# Patient Record
Sex: FEMALE | Race: WHITE | URBAN - METROPOLITAN AREA
[De-identification: names, ages, dates, MRNs, and addresses within clinical notes are randomized per-mention and may not be internally consistent; named-entity substitution may affect disease eponyms.]

---

## 2019-12-10 ENCOUNTER — APPOINTMENT (RX ONLY)
Dept: URBAN - METROPOLITAN AREA CLINIC 281 | Facility: CLINIC | Age: 79
Setting detail: DERMATOLOGY
End: 2019-12-10

## 2019-12-10 DIAGNOSIS — L81.4 OTHER MELANIN HYPERPIGMENTATION: ICD-10-CM

## 2019-12-10 DIAGNOSIS — L56.5 DISSEMINATED SUPERFICIAL ACTINIC POROKERATOSIS (DSAP): ICD-10-CM

## 2019-12-10 DIAGNOSIS — L82.0 INFLAMED SEBORRHEIC KERATOSIS: ICD-10-CM

## 2019-12-10 DIAGNOSIS — L82.1 OTHER SEBORRHEIC KERATOSIS: ICD-10-CM

## 2019-12-10 DIAGNOSIS — L85.3 XEROSIS CUTIS: ICD-10-CM

## 2019-12-10 DIAGNOSIS — D22 MELANOCYTIC NEVI: ICD-10-CM

## 2019-12-10 PROBLEM — D22.5 MELANOCYTIC NEVI OF TRUNK: Status: ACTIVE | Noted: 2019-12-10

## 2019-12-10 PROCEDURE — 17110 DESTRUCTION B9 LES UP TO 14: CPT

## 2019-12-10 PROCEDURE — ? COUNSELING

## 2019-12-10 PROCEDURE — ? LIQUID NITROGEN

## 2019-12-10 PROCEDURE — 99213 OFFICE O/P EST LOW 20 MIN: CPT | Mod: 25

## 2019-12-10 ASSESSMENT — LOCATION ZONE DERM
LOCATION ZONE: TRUNK
LOCATION ZONE: FEET
LOCATION ZONE: FINGER
LOCATION ZONE: NECK

## 2019-12-10 ASSESSMENT — LOCATION DETAILED DESCRIPTION DERM
LOCATION DETAILED: LEFT MID-UPPER BACK
LOCATION DETAILED: INFERIOR THORACIC SPINE
LOCATION DETAILED: LEFT SUPERIOR MEDIAL UPPER BACK
LOCATION DETAILED: RIGHT MEDIAL PLANTAR MIDFOOT
LOCATION DETAILED: RIGHT RIB CAGE
LOCATION DETAILED: LEFT CLAVICULAR NECK
LOCATION DETAILED: RIGHT PROXIMAL PALMAR SMALL FINGER

## 2019-12-10 ASSESSMENT — LOCATION SIMPLE DESCRIPTION DERM
LOCATION SIMPLE: LEFT UPPER BACK
LOCATION SIMPLE: RIGHT SMALL FINGER
LOCATION SIMPLE: RIGHT PLANTAR SURFACE
LOCATION SIMPLE: UPPER BACK
LOCATION SIMPLE: LEFT ANTERIOR NECK
LOCATION SIMPLE: ABDOMEN

## 2019-12-10 NOTE — PROCEDURE: LIQUID NITROGEN
Include Z78.9 (Other Specified Conditions Influencing Health Status) As An Associated Diagnosis?: No
Detail Level: Detailed
Post-Care Instructions: I reviewed with the patient in detail post-care instructions. Patient is to avoid picking at any of the treated lesions. Pt may apply Vaseline to crusted or scabbing areas.
Medical Necessity Clause: This procedure was medically necessary because the lesions that were treated were:
Consent: Verbal consent was obtained including but not limited to risks of crusting, scabbing, blistering, scarring, darker or lighter pigmentary change, recurrence, incomplete removal and infection.
Number Of Freeze-Thaw Cycles: 2 freeze-thaw cycles
Medical Necessity Information: It is in your best interest to select a reason for this procedure from the list below. All of these items fulfill various CMS LCD requirements except the new and changing color options.
Render Post-Care Instructions In Note?: yes

## 2022-01-21 ENCOUNTER — APPOINTMENT (RX ONLY)
Dept: URBAN - METROPOLITAN AREA CLINIC 281 | Facility: CLINIC | Age: 82
Setting detail: DERMATOLOGY
End: 2022-01-21

## 2022-01-21 DIAGNOSIS — L85.3 XEROSIS CUTIS: ICD-10-CM

## 2022-01-21 DIAGNOSIS — L57.0 ACTINIC KERATOSIS: ICD-10-CM

## 2022-01-21 DIAGNOSIS — D22 MELANOCYTIC NEVI: ICD-10-CM

## 2022-01-21 DIAGNOSIS — D18.0 HEMANGIOMA: ICD-10-CM

## 2022-01-21 DIAGNOSIS — L81.4 OTHER MELANIN HYPERPIGMENTATION: ICD-10-CM

## 2022-01-21 DIAGNOSIS — L82.1 OTHER SEBORRHEIC KERATOSIS: ICD-10-CM

## 2022-01-21 DIAGNOSIS — L56.5 DISSEMINATED SUPERFICIAL ACTINIC POROKERATOSIS (DSAP): ICD-10-CM

## 2022-01-21 DIAGNOSIS — L73.8 OTHER SPECIFIED FOLLICULAR DISORDERS: ICD-10-CM

## 2022-01-21 PROBLEM — D18.01 HEMANGIOMA OF SKIN AND SUBCUTANEOUS TISSUE: Status: ACTIVE | Noted: 2022-01-21

## 2022-01-21 PROBLEM — D22.5 MELANOCYTIC NEVI OF TRUNK: Status: ACTIVE | Noted: 2022-01-21

## 2022-01-21 PROCEDURE — ? LIQUID NITROGEN

## 2022-01-21 PROCEDURE — ? COUNSELING

## 2022-01-21 PROCEDURE — ? FULL BODY SKIN EXAM

## 2022-01-21 PROCEDURE — 17000 DESTRUCT PREMALG LESION: CPT

## 2022-01-21 PROCEDURE — ? TREATMENT REGIMEN

## 2022-01-21 PROCEDURE — 17003 DESTRUCT PREMALG LES 2-14: CPT

## 2022-01-21 PROCEDURE — 99213 OFFICE O/P EST LOW 20 MIN: CPT | Mod: 25

## 2022-01-21 ASSESSMENT — LOCATION SIMPLE DESCRIPTION DERM
LOCATION SIMPLE: LEFT UPPER BACK
LOCATION SIMPLE: RIGHT FOREHEAD
LOCATION SIMPLE: POSTERIOR NECK
LOCATION SIMPLE: UPPER BACK
LOCATION SIMPLE: RIGHT SMALL FINGER
LOCATION SIMPLE: RIGHT EYEBROW
LOCATION SIMPLE: RIGHT PLANTAR SURFACE
LOCATION SIMPLE: LEFT FOREHEAD
LOCATION SIMPLE: LEFT ANTERIOR NECK
LOCATION SIMPLE: LEFT UPPER LIP
LOCATION SIMPLE: RIGHT LIP

## 2022-01-21 ASSESSMENT — LOCATION ZONE DERM
LOCATION ZONE: FEET
LOCATION ZONE: LIP
LOCATION ZONE: TRUNK
LOCATION ZONE: FINGER
LOCATION ZONE: NECK
LOCATION ZONE: FACE

## 2022-01-21 ASSESSMENT — LOCATION DETAILED DESCRIPTION DERM
LOCATION DETAILED: RIGHT SUPERIOR FOREHEAD
LOCATION DETAILED: RIGHT CENTRAL EYEBROW
LOCATION DETAILED: LEFT SUPERIOR MEDIAL UPPER BACK
LOCATION DETAILED: RIGHT PROXIMAL PALMAR SMALL FINGER
LOCATION DETAILED: LEFT CLAVICULAR NECK
LOCATION DETAILED: LEFT SUPERIOR VERMILION BORDER
LOCATION DETAILED: LEFT MEDIAL TRAPEZIAL NECK
LOCATION DETAILED: RIGHT SUPERIOR VERMILION LIP
LOCATION DETAILED: RIGHT MEDIAL PLANTAR MIDFOOT
LOCATION DETAILED: LEFT FOREHEAD
LOCATION DETAILED: INFERIOR THORACIC SPINE

## 2022-01-21 NOTE — PROCEDURE: LIQUID NITROGEN
Duration Of Freeze Thaw-Cycle (Seconds): 0
Show Aperture Variable?: Yes
Render Post-Care Instructions In Note?: no
Consent: The patient's consent was obtained including but not limited to risks of crusting, scabbing, blistering, scarring, darker or lighter pigmentary change, recurrence, incomplete removal and infection.
Post-Care Instructions: I reviewed with the patient in detail post-care instructions. Patient is to wear sunprotection, and avoid picking at any of the treated lesions. Pt may apply Vaseline to crusted or scabbing areas.
Number Of Freeze-Thaw Cycles: 2 freeze-thaw cycles
Detail Level: Detailed

## 2022-03-21 ENCOUNTER — APPOINTMENT (RX ONLY)
Dept: URBAN - METROPOLITAN AREA CLINIC 281 | Facility: CLINIC | Age: 82
Setting detail: DERMATOLOGY
End: 2022-03-21

## 2022-03-21 DIAGNOSIS — L30.8 OTHER SPECIFIED DERMATITIS: ICD-10-CM | Status: WORSENING

## 2022-03-21 DIAGNOSIS — L57.0 ACTINIC KERATOSIS: ICD-10-CM

## 2022-03-21 DIAGNOSIS — L56.5 DISSEMINATED SUPERFICIAL ACTINIC POROKERATOSIS (DSAP): ICD-10-CM

## 2022-03-21 PROBLEM — L30.9 DERMATITIS, UNSPECIFIED: Status: ACTIVE | Noted: 2022-03-21

## 2022-03-21 PROCEDURE — ? COUNSELING

## 2022-03-21 PROCEDURE — ? PRESCRIPTION

## 2022-03-21 PROCEDURE — ? PRESCRIPTION MEDICATION MANAGEMENT

## 2022-03-21 PROCEDURE — 99214 OFFICE O/P EST MOD 30 MIN: CPT

## 2022-03-21 RX ORDER — BETAMETHASONE DIPROPIONATE 0.5 MG/G
CREAM TOPICAL
Qty: 15 | Refills: 1 | Status: ERX | COMMUNITY
Start: 2022-03-21

## 2022-03-21 RX ADMIN — BETAMETHASONE DIPROPIONATE: 0.5 CREAM TOPICAL at 00:00

## 2022-03-21 ASSESSMENT — LOCATION SIMPLE DESCRIPTION DERM
LOCATION SIMPLE: RIGHT ANKLE
LOCATION SIMPLE: RIGHT RING FINGER
LOCATION SIMPLE: LEFT LIP
LOCATION SIMPLE: RIGHT MIDDLE FINGER

## 2022-03-21 ASSESSMENT — LOCATION DETAILED DESCRIPTION DERM
LOCATION DETAILED: RIGHT PROXIMAL DORSAL MIDDLE FINGER
LOCATION DETAILED: RIGHT RING FINGER DISTAL INTERPHALANGEAL JOINT
LOCATION DETAILED: RIGHT ANTERIOR MEDIAL MALLEOLUS
LOCATION DETAILED: LEFT UPPER CUTANEOUS LIP

## 2022-03-21 ASSESSMENT — LOCATION ZONE DERM
LOCATION ZONE: LEG
LOCATION ZONE: LIP
LOCATION ZONE: FINGER

## 2022-03-21 NOTE — PROCEDURE: PRESCRIPTION MEDICATION MANAGEMENT
Render In Strict Bullet Format?: No
Initiate Treatment: Betamethasone dipropionate 0.05 % topical cream
Detail Level: Zone

## 2023-01-31 ENCOUNTER — APPOINTMENT (RX ONLY)
Dept: URBAN - METROPOLITAN AREA CLINIC 281 | Facility: CLINIC | Age: 83
Setting detail: DERMATOLOGY
End: 2023-01-31

## 2023-01-31 DIAGNOSIS — L82.1 OTHER SEBORRHEIC KERATOSIS: ICD-10-CM

## 2023-01-31 DIAGNOSIS — D18.0 HEMANGIOMA: ICD-10-CM

## 2023-01-31 DIAGNOSIS — L56.5 DISSEMINATED SUPERFICIAL ACTINIC POROKERATOSIS (DSAP): ICD-10-CM

## 2023-01-31 DIAGNOSIS — L81.4 OTHER MELANIN HYPERPIGMENTATION: ICD-10-CM

## 2023-01-31 DIAGNOSIS — L71.8 OTHER ROSACEA: ICD-10-CM

## 2023-01-31 DIAGNOSIS — L57.0 ACTINIC KERATOSIS: ICD-10-CM

## 2023-01-31 DIAGNOSIS — D22 MELANOCYTIC NEVI: ICD-10-CM

## 2023-01-31 PROBLEM — D22.5 MELANOCYTIC NEVI OF TRUNK: Status: ACTIVE | Noted: 2023-01-31

## 2023-01-31 PROBLEM — D18.01 HEMANGIOMA OF SKIN AND SUBCUTANEOUS TISSUE: Status: ACTIVE | Noted: 2023-01-31

## 2023-01-31 PROCEDURE — 17000 DESTRUCT PREMALG LESION: CPT

## 2023-01-31 PROCEDURE — ? SUNSCREEN RECOMMENDATIONS

## 2023-01-31 PROCEDURE — 17003 DESTRUCT PREMALG LES 2-14: CPT

## 2023-01-31 PROCEDURE — 99213 OFFICE O/P EST LOW 20 MIN: CPT | Mod: 25

## 2023-01-31 PROCEDURE — ? COUNSELING

## 2023-01-31 PROCEDURE — ? LIQUID NITROGEN

## 2023-01-31 PROCEDURE — ? FULL BODY SKIN EXAM

## 2023-01-31 ASSESSMENT — LOCATION SIMPLE DESCRIPTION DERM
LOCATION SIMPLE: RIGHT ANKLE
LOCATION SIMPLE: RIGHT UPPER BACK
LOCATION SIMPLE: LEFT CHEEK
LOCATION SIMPLE: GLABELLA
LOCATION SIMPLE: LEFT LIP
LOCATION SIMPLE: POSTERIOR SCALP
LOCATION SIMPLE: LEFT ANKLE

## 2023-01-31 ASSESSMENT — LOCATION ZONE DERM
LOCATION ZONE: LEG
LOCATION ZONE: TRUNK
LOCATION ZONE: SCALP
LOCATION ZONE: LIP
LOCATION ZONE: FACE

## 2023-01-31 ASSESSMENT — LOCATION DETAILED DESCRIPTION DERM
LOCATION DETAILED: LEFT ANKLE
LOCATION DETAILED: LEFT MEDIAL MALAR CHEEK
LOCATION DETAILED: MID-OCCIPITAL SCALP
LOCATION DETAILED: RIGHT MEDIAL UPPER BACK
LOCATION DETAILED: GLABELLA
LOCATION DETAILED: LEFT UPPER CUTANEOUS LIP
LOCATION DETAILED: RIGHT ANKLE
LOCATION DETAILED: RIGHT SUPERIOR MEDIAL UPPER BACK

## 2023-01-31 NOTE — PROCEDURE: LIQUID NITROGEN
Render Post-Care Instructions In Note?: no
Detail Level: Detailed
Show Applicator Variable?: Yes
Duration Of Freeze Thaw-Cycle (Seconds): 0
Post-Care Instructions: I reviewed with the patient in detail post-care instructions. Patient is to wear sunprotection, and avoid picking at any of the treated lesions. Pt may apply Vaseline to crusted or scabbing areas.
Number Of Freeze-Thaw Cycles: 2 freeze-thaw cycles
Consent: The patient's consent was obtained including but not limited to risks of crusting, scabbing, blistering, scarring, darker or lighter pigmentary change, recurrence, incomplete removal and infection.

## 2023-01-31 NOTE — PROCEDURE: MIPS QUALITY
Quality 226: Preventive Care And Screening: Tobacco Use: Screening And Cessation Intervention: Patient screened for tobacco use and is an ex/non-smoker
Detail Level: Detailed
Quality 130: Documentation Of Current Medications In The Medical Record: Current Medications Documented
Quality 47: Advance Care Plan: Advance Care Planning discussed and documented; advance care plan or surrogate decision maker documented in the medical record.
Quality 110: Preventive Care And Screening: Influenza Immunization: Influenza Immunization Administered during Influenza season
Quality 431: Preventive Care And Screening: Unhealthy Alcohol Use - Screening: Patient not identified as an unhealthy alcohol user when screened for unhealthy alcohol use using a systematic screening method

## 2024-08-12 ENCOUNTER — APPOINTMENT (RX ONLY)
Dept: URBAN - METROPOLITAN AREA CLINIC 281 | Facility: CLINIC | Age: 84
Setting detail: DERMATOLOGY
End: 2024-08-12

## 2024-08-12 DIAGNOSIS — L56.5 DISSEMINATED SUPERFICIAL ACTINIC POROKERATOSIS (DSAP): ICD-10-CM

## 2024-08-12 DIAGNOSIS — L30.4 ERYTHEMA INTERTRIGO: ICD-10-CM

## 2024-08-12 DIAGNOSIS — L81.4 OTHER MELANIN HYPERPIGMENTATION: ICD-10-CM

## 2024-08-12 DIAGNOSIS — D18.0 HEMANGIOMA: ICD-10-CM

## 2024-08-12 DIAGNOSIS — L82.1 OTHER SEBORRHEIC KERATOSIS: ICD-10-CM

## 2024-08-12 DIAGNOSIS — D22 MELANOCYTIC NEVI: ICD-10-CM

## 2024-08-12 DIAGNOSIS — L57.0 ACTINIC KERATOSIS: ICD-10-CM

## 2024-08-12 PROBLEM — D18.01 HEMANGIOMA OF SKIN AND SUBCUTANEOUS TISSUE: Status: ACTIVE | Noted: 2024-08-12

## 2024-08-12 PROBLEM — D22.5 MELANOCYTIC NEVI OF TRUNK: Status: ACTIVE | Noted: 2024-08-12

## 2024-08-12 PROCEDURE — 99213 OFFICE O/P EST LOW 20 MIN: CPT | Mod: 25

## 2024-08-12 PROCEDURE — ? LIQUID NITROGEN

## 2024-08-12 PROCEDURE — ? FULL BODY SKIN EXAM

## 2024-08-12 PROCEDURE — ? SUNSCREEN RECOMMENDATIONS

## 2024-08-12 PROCEDURE — ? PRESCRIPTION MEDICATION MANAGEMENT

## 2024-08-12 PROCEDURE — 17000 DESTRUCT PREMALG LESION: CPT

## 2024-08-12 PROCEDURE — ? COUNSELING

## 2024-08-12 ASSESSMENT — LOCATION DETAILED DESCRIPTION DERM
LOCATION DETAILED: LEFT ANKLE
LOCATION DETAILED: SUPERIOR THORACIC SPINE
LOCATION DETAILED: LEFT ELBOW
LOCATION DETAILED: SUPERIOR LUMBAR SPINE
LOCATION DETAILED: RIGHT SUPERIOR MEDIAL UPPER BACK
LOCATION DETAILED: RIGHT MEDIAL UPPER BACK
LOCATION DETAILED: RIGHT ANKLE

## 2024-08-12 ASSESSMENT — LOCATION SIMPLE DESCRIPTION DERM
LOCATION SIMPLE: LOWER BACK
LOCATION SIMPLE: LEFT ELBOW
LOCATION SIMPLE: LEFT ANKLE
LOCATION SIMPLE: RIGHT UPPER BACK
LOCATION SIMPLE: RIGHT ANKLE
LOCATION SIMPLE: UPPER BACK

## 2024-08-12 ASSESSMENT — LOCATION ZONE DERM
LOCATION ZONE: LEG
LOCATION ZONE: ARM
LOCATION ZONE: TRUNK

## 2024-08-12 ASSESSMENT — BSA RASH: BSA RASH: 1

## 2024-08-12 ASSESSMENT — SEVERITY ASSESSMENT: SEVERITY: ALMOST CLEAR

## 2024-08-12 ASSESSMENT — PAIN INTENSITY VAS: HOW INTENSE IS YOUR PAIN 0 BEING NO PAIN, 10 BEING THE MOST SEVERE PAIN POSSIBLE?: NO PAIN

## 2024-08-12 NOTE — PROCEDURE: PRESCRIPTION MEDICATION MANAGEMENT
Detail Level: Zone
Initiate Treatment: Restart : ketoconazole 2 % topical cream Bid; Sig: Apply to affected area BID PRN until clear ( Rx prescribed by PCP).
Render In Strict Bullet Format?: No
Statement Selected

## 2024-08-12 NOTE — PROCEDURE: LIQUID NITROGEN
Show Applicator Variable?: Yes
Render Post-Care Instructions In Note?: no
Detail Level: Detailed
Duration Of Freeze Thaw-Cycle (Seconds): 0
Post-Care Instructions: I reviewed with the patient in detail post-care instructions. Patient is to wear sunprotection, and avoid picking at any of the treated lesions. Pt may apply Vaseline to crusted or scabbing areas.
Consent: The patient's consent was obtained including but not limited to risks of crusting, scabbing, blistering, scarring, darker or lighter pigmentary change, recurrence, incomplete removal and infection.
Number Of Freeze-Thaw Cycles: 3 freeze-thaw cycles

## 2025-03-20 ENCOUNTER — APPOINTMENT (OUTPATIENT)
Dept: URBAN - METROPOLITAN AREA CLINIC 281 | Facility: CLINIC | Age: 85
Setting detail: DERMATOLOGY
End: 2025-03-20

## 2025-03-20 DIAGNOSIS — L91.8 OTHER HYPERTROPHIC DISORDERS OF THE SKIN: ICD-10-CM

## 2025-03-20 DIAGNOSIS — L56.5 DISSEMINATED SUPERFICIAL ACTINIC POROKERATOSIS (DSAP): ICD-10-CM

## 2025-03-20 DIAGNOSIS — L72.0 EPIDERMAL CYST: ICD-10-CM

## 2025-03-20 DIAGNOSIS — L81.4 OTHER MELANIN HYPERPIGMENTATION: ICD-10-CM

## 2025-03-20 PROCEDURE — ? SUNSCREEN RECOMMENDATIONS

## 2025-03-20 PROCEDURE — ? COUNSELING

## 2025-03-20 PROCEDURE — 99213 OFFICE O/P EST LOW 20 MIN: CPT

## 2025-03-20 ASSESSMENT — LOCATION DETAILED DESCRIPTION DERM
LOCATION DETAILED: RIGHT ANKLE
LOCATION DETAILED: LEFT INFERIOR MEDIAL MALAR CHEEK
LOCATION DETAILED: LEFT ANKLE
LOCATION DETAILED: RIGHT MEDIAL INFERIOR EYELID
LOCATION DETAILED: RIGHT MEDIAL INFERIOR EYELID

## 2025-03-20 ASSESSMENT — LOCATION ZONE DERM
LOCATION ZONE: FACE
LOCATION ZONE: LEG
LOCATION ZONE: EYELID
LOCATION ZONE: EYELID

## 2025-03-20 ASSESSMENT — LOCATION SIMPLE DESCRIPTION DERM
LOCATION SIMPLE: RIGHT INFERIOR EYELID
LOCATION SIMPLE: RIGHT INFERIOR EYELID
LOCATION SIMPLE: RIGHT ANKLE
LOCATION SIMPLE: LEFT ANKLE
LOCATION SIMPLE: LEFT CHEEK

## 2025-07-15 ENCOUNTER — APPOINTMENT (OUTPATIENT)
Dept: URBAN - METROPOLITAN AREA CLINIC 281 | Facility: CLINIC | Age: 85
Setting detail: DERMATOLOGY
End: 2025-07-15

## 2025-07-15 DIAGNOSIS — L82.1 OTHER SEBORRHEIC KERATOSIS: ICD-10-CM

## 2025-07-15 DIAGNOSIS — D485 NEOPLASM OF UNCERTAIN BEHAVIOR OF SKIN: ICD-10-CM

## 2025-07-15 PROBLEM — D48.5 NEOPLASM OF UNCERTAIN BEHAVIOR OF SKIN: Status: ACTIVE | Noted: 2025-07-15

## 2025-07-15 PROCEDURE — ? COUNSELING

## 2025-07-15 PROCEDURE — ? BIOPSY BY SHAVE METHOD

## 2025-07-15 ASSESSMENT — LOCATION SIMPLE DESCRIPTION DERM
LOCATION SIMPLE: LEFT FOREARM
LOCATION SIMPLE: RIGHT ELBOW

## 2025-07-15 ASSESSMENT — LOCATION ZONE DERM: LOCATION ZONE: ARM

## 2025-07-15 ASSESSMENT — LOCATION DETAILED DESCRIPTION DERM
LOCATION DETAILED: RIGHT ELBOW
LOCATION DETAILED: LEFT VENTRAL DISTAL FOREARM

## 2025-08-12 ENCOUNTER — APPOINTMENT (OUTPATIENT)
Dept: URBAN - METROPOLITAN AREA CLINIC 281 | Facility: CLINIC | Age: 85
Setting detail: DERMATOLOGY
End: 2025-08-12

## 2025-08-12 DIAGNOSIS — L56.5 DISSEMINATED SUPERFICIAL ACTINIC POROKERATOSIS (DSAP): ICD-10-CM

## 2025-08-12 DIAGNOSIS — D22 MELANOCYTIC NEVI: ICD-10-CM

## 2025-08-12 DIAGNOSIS — L81.4 OTHER MELANIN HYPERPIGMENTATION: ICD-10-CM

## 2025-08-12 DIAGNOSIS — L30.4 ERYTHEMA INTERTRIGO: ICD-10-CM | Status: RESOLVED

## 2025-08-12 DIAGNOSIS — L30.9 DERMATITIS, UNSPECIFIED: ICD-10-CM

## 2025-08-12 DIAGNOSIS — D18.0 HEMANGIOMA: ICD-10-CM

## 2025-08-12 DIAGNOSIS — L82.1 OTHER SEBORRHEIC KERATOSIS: ICD-10-CM

## 2025-08-12 PROBLEM — C44.629 SQUAMOUS CELL CARCINOMA OF SKIN OF LEFT UPPER LIMB, INCLUDING SHOULDER: Status: ACTIVE | Noted: 2025-08-12

## 2025-08-12 PROBLEM — D18.01 HEMANGIOMA OF SKIN AND SUBCUTANEOUS TISSUE: Status: ACTIVE | Noted: 2025-08-12

## 2025-08-12 PROBLEM — D22.5 MELANOCYTIC NEVI OF TRUNK: Status: ACTIVE | Noted: 2025-08-12

## 2025-08-12 PROCEDURE — ? PRESCRIPTION

## 2025-08-12 PROCEDURE — ? SUNSCREEN RECOMMENDATIONS

## 2025-08-12 PROCEDURE — ? PRESCRIPTION MEDICATION MANAGEMENT

## 2025-08-12 PROCEDURE — ? TREATMENT REGIMEN

## 2025-08-12 PROCEDURE — ? DEFER

## 2025-08-12 PROCEDURE — ? FULL BODY SKIN EXAM

## 2025-08-12 PROCEDURE — ? COUNSELING

## 2025-08-12 RX ORDER — TRIAMCINOLONE ACETONIDE 1 MG/G
CREAM TOPICAL BID
Qty: 454 | Refills: 0 | Status: ERX | COMMUNITY
Start: 2025-08-12

## 2025-08-12 RX ADMIN — TRIAMCINOLONE ACETONIDE: 1 CREAM TOPICAL at 00:00

## 2025-08-12 ASSESSMENT — LOCATION DETAILED DESCRIPTION DERM
LOCATION DETAILED: RIGHT ANKLE
LOCATION DETAILED: RIGHT MEDIAL UPPER BACK
LOCATION DETAILED: LEFT ANKLE
LOCATION DETAILED: SUPERIOR THORACIC SPINE
LOCATION DETAILED: SUPERIOR LUMBAR SPINE
LOCATION DETAILED: RIGHT SUPERIOR MEDIAL UPPER BACK
LOCATION DETAILED: RIGHT MEDIAL SUPERIOR CHEST
LOCATION DETAILED: LEFT LATERAL SUPERIOR CHEST

## 2025-08-12 ASSESSMENT — LOCATION SIMPLE DESCRIPTION DERM
LOCATION SIMPLE: UPPER BACK
LOCATION SIMPLE: RIGHT UPPER BACK
LOCATION SIMPLE: LOWER BACK
LOCATION SIMPLE: LEFT ANKLE
LOCATION SIMPLE: RIGHT ANKLE
LOCATION SIMPLE: CHEST

## 2025-08-12 ASSESSMENT — LOCATION ZONE DERM
LOCATION ZONE: LEG
LOCATION ZONE: TRUNK